# Patient Record
Sex: FEMALE | Race: OTHER | HISPANIC OR LATINO | Employment: UNEMPLOYED | URBAN - METROPOLITAN AREA
[De-identification: names, ages, dates, MRNs, and addresses within clinical notes are randomized per-mention and may not be internally consistent; named-entity substitution may affect disease eponyms.]

---
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
## 2024-01-10 ENCOUNTER — APPOINTMENT (OUTPATIENT)
Dept: MEDICAL GROUP | Facility: MEDICAL CENTER | Age: 19
End: 2024-01-10
Payer: COMMERCIAL

## 2024-02-02 ENCOUNTER — APPOINTMENT (OUTPATIENT)
Dept: MEDICAL GROUP | Facility: MEDICAL CENTER | Age: 19
End: 2024-02-02
Payer: COMMERCIAL

## 2024-03-04 ENCOUNTER — GYNECOLOGY VISIT (OUTPATIENT)
Dept: OBGYN | Facility: CLINIC | Age: 19
End: 2024-03-04
Payer: COMMERCIAL

## 2024-03-04 ENCOUNTER — APPOINTMENT (OUTPATIENT)
Dept: OBGYN | Facility: CLINIC | Age: 19
End: 2024-03-04
Payer: COMMERCIAL

## 2024-03-04 VITALS
WEIGHT: 174 LBS | DIASTOLIC BLOOD PRESSURE: 77 MMHG | HEIGHT: 62 IN | SYSTOLIC BLOOD PRESSURE: 91 MMHG | BODY MASS INDEX: 32.02 KG/M2

## 2024-03-04 DIAGNOSIS — N92.0 MENORRHAGIA WITH REGULAR CYCLE: Primary | ICD-10-CM

## 2024-03-04 PROCEDURE — 3074F SYST BP LT 130 MM HG: CPT | Performed by: OBSTETRICS & GYNECOLOGY

## 2024-03-04 PROCEDURE — 3078F DIAST BP <80 MM HG: CPT | Performed by: OBSTETRICS & GYNECOLOGY

## 2024-03-04 PROCEDURE — 99212 OFFICE O/P EST SF 10 MIN: CPT | Performed by: OBSTETRICS & GYNECOLOGY

## 2024-03-04 RX ORDER — PROPRANOLOL HYDROCHLORIDE 120 MG/1
120 CAPSULE, EXTENDED RELEASE ORAL DAILY
COMMUNITY

## 2024-03-04 RX ORDER — LAMOTRIGINE 25 MG/1
100 TABLET ORAL DAILY
COMMUNITY

## 2024-03-04 RX ORDER — LURASIDONE HYDROCHLORIDE 40 MG/1
40 TABLET, FILM COATED ORAL
COMMUNITY

## 2024-03-04 NOTE — PROGRESS NOTES
New GYN Problem Note    CC:   Annual Exam      HPI:   Patient is a 18 y.o.  who presents complaining of heavy menses for all her life, even on OCP. She has been on OCP since about age 15 (Blisovi) and then was switched to a differed pill for a time but it caused more nausea and recently resumed Blisovi about 4 months ago. She reports that even on the pill her periods are heavy and cause her fatigue, iron deficiency anemia. She states her mother also has always had heavy periods all her life. She denies knowledge of a family history of bleeding disorders.  She claims her periods are irregular even on birth control but just states they don't start on the same day of each placebo week. Pt is counseled that this is normal.  She admits she has a hard time remembering to take her pill and misses up to 3 or 4 each month.  She takes a lot of other medication for bipolar, anxiety and ADHD so she would like less medication in general and would like to discuss alternatives to OCP.    She is currently sexually active but not since about January.  She has multiple labs ordered by PCP in November including thyroid and STD screening but has yet to complete them.      GYNECOLOGIC HISTORY:   Current Sexual Activity: yes  History of sexually transmitted diseases? No  Abnormal discharge? No     Menstrual History  Patient's last menstrual period was Patient's last menstrual period was 2024 (exact date).  Periods are regular  q 25-28 days, lasting 5 days.     Clots or heavy flow: Yes  Intermenstrual bleeding/spotting: No  Sexual problems: No  Significant pelvic pain: No  Bothersome PMS: No  Bothersome menopausal symptoms: No     Contraception  Blisovi      Pap History  Last Pap: n/a    Sexually active:    Social History     Substance and Sexual Activity   Sexual Activity Not Currently    Partners: Male    Birth control/protection: Pill, Emergency Contraception       OBSTETRIC HISTORY:  OB History    Para Term  " AB Living   0 0 0 0 0 0   SAB IAB Ectopic Molar Multiple Live Births   0 0 0 0 0 0       MEDICAL HISTORY:  Past Medical History:   Diagnosis Date    Anemia     Anxiety     Depression     GERD (gastroesophageal reflux disease)     Migraine        SURGICAL HISTORY:  History reviewed. No pertinent surgical history.    FAMILY HISTORY:  Family History   Problem Relation Age of Onset    Alcohol abuse Mother     Diabetes Maternal Grandmother     Hyperlipidemia Maternal Grandmother     Hypertension Paternal Aunt        ALLERGIES / REACTIONS:  Allergies   Allergen Reactions    Kiwi Extract Anaphylaxis    Pineapple Anaphylaxis     Throat closes, rashes inside mouth    Brazil Nuts      Mild to severe       SOCIAL HISTORY:   reports that she has never smoked. She has never used smokeless tobacco. She reports that she does not drink alcohol and does not use drugs.    ROS:   Positive ROS: none  Gen: no fevers or chills, no significant weight loss or gain, excessive fatigue  Respiratory:  no cough or dyspnea  Cardiac:  no chest pain, no palpitations, no syncope  Breast: no breast discharge, pain, lump or skin changes  GI:  no heartburn, no abdominal pain, no nausea or vomiting  Urinary: no dysuria, urgency, frequency, incontinence   Psych: no depression or anxiety  Neuro: no migraines with aura, fainting spells, numbness or tingling  Extremities: no joint pain, persistently swollen ankles, recurrent leg cramps       PHYSICAL EXAMINATION:  Vital Signs:   Vitals:    24 1114   BP: 91/77   BP Location: Right arm   Patient Position: Sitting   BP Cuff Size: Adult   Weight: 174 lb   Height: 5' 2\"     Body mass index is 31.83 kg/m².  Constitutional: The patient is well developed and well nourished.  Psychiatric: Patient is oriented to time place and person.   Skin: No rash observed.  Neck: Neck appears symmetric.  Respiratory: normal effort  Abdomen: Soft, non-tender.  Pelvic: exam deferred today  Extremeties: Legs are " symmetric and without tenderness. There is no edema present.    ASSESSMENT AND PLAN:  18 y.o.       1. Menorrhagia with regular cycle   - discussed alternatives to OCP to control her bleeding   - first line choice would be Mirena IUD.  Discussed insertion, SE profile, bleeding efficacy   - discussed 2nd choice would be depo shot but often takes 2+ shot before benefits of amenorrhea.  In the preceding 6 mo there may be frequent or heavy periods of bleeding. This sometimes precludes continued use   - after discussion, pt states she will schedule for IUD insertion. States she has a poor pain tolerance and is counseled that MOST people will succeed with in office placement but if not, we can schedule for OR placement   - discussed screening for VWD in addition to other labs ordered by PCP.  She will get these done    - [facility] PROFILE; Future        Kenia Roach D.O.

## 2024-03-08 ENCOUNTER — APPOINTMENT (OUTPATIENT)
Dept: MEDICAL GROUP | Facility: MEDICAL CENTER | Age: 19
End: 2024-03-08
Payer: COMMERCIAL

## 2024-03-24 ENCOUNTER — APPOINTMENT (OUTPATIENT)
Dept: TELEHEALTH | Facility: TELEMEDICINE | Age: 19
End: 2024-03-24
Payer: COMMERCIAL

## 2024-04-12 ENCOUNTER — APPOINTMENT (OUTPATIENT)
Dept: OBGYN | Facility: CLINIC | Age: 19
End: 2024-04-12
Payer: COMMERCIAL

## 2024-04-24 ENCOUNTER — APPOINTMENT (OUTPATIENT)
Dept: URGENT CARE | Facility: CLINIC | Age: 19
End: 2024-04-24
Payer: COMMERCIAL

## 2024-05-16 ENCOUNTER — TELEMEDICINE (OUTPATIENT)
Dept: MEDICAL GROUP | Facility: MEDICAL CENTER | Age: 19
End: 2024-05-16
Payer: COMMERCIAL

## 2024-05-16 VITALS — HEIGHT: 62 IN | BODY MASS INDEX: 30.36 KG/M2 | WEIGHT: 165 LBS

## 2024-05-16 DIAGNOSIS — R53.83 FATIGUE, UNSPECIFIED TYPE: ICD-10-CM

## 2024-05-16 DIAGNOSIS — R23.3 EASY BRUISING: ICD-10-CM

## 2024-05-16 DIAGNOSIS — F33.1 MODERATE EPISODE OF RECURRENT MAJOR DEPRESSIVE DISORDER (HCC): ICD-10-CM

## 2024-05-16 DIAGNOSIS — Z91.018 FOOD ALLERGY: ICD-10-CM

## 2024-05-16 DIAGNOSIS — K21.9 GASTROESOPHAGEAL REFLUX DISEASE WITHOUT ESOPHAGITIS: ICD-10-CM

## 2024-05-16 DIAGNOSIS — R07.9 CHEST PAIN, UNSPECIFIED TYPE: ICD-10-CM

## 2024-05-16 DIAGNOSIS — F41.9 ANXIETY: ICD-10-CM

## 2024-05-16 DIAGNOSIS — F31.81 BIPOLAR 2 DISORDER (HCC): ICD-10-CM

## 2024-05-16 PROBLEM — G47.26 SHIFT WORK SLEEP DISORDER: Status: ACTIVE | Noted: 2024-05-16

## 2024-05-16 PROCEDURE — 99214 OFFICE O/P EST MOD 30 MIN: CPT | Mod: 95

## 2024-05-16 RX ORDER — DOXYCYCLINE HYCLATE 50 MG/1
50 CAPSULE ORAL
COMMUNITY
Start: 2024-05-01

## 2024-05-16 RX ORDER — LAMOTRIGINE 100 MG/1
100 TABLET ORAL DAILY
COMMUNITY

## 2024-05-16 RX ORDER — METHYLPHENIDATE HYDROCHLORIDE 10 MG/1
TABLET ORAL
COMMUNITY

## 2024-05-16 RX ORDER — CLINDAMYCIN PHOSPHATE 10 UG/ML
LOTION TOPICAL
COMMUNITY
Start: 2024-04-29

## 2024-05-16 RX ORDER — TRETINOIN 0.5 MG/G
CREAM TOPICAL
COMMUNITY
Start: 2024-04-28

## 2024-05-16 ASSESSMENT — PATIENT HEALTH QUESTIONNAIRE - PHQ9: CLINICAL INTERPRETATION OF PHQ2 SCORE: 0

## 2024-05-16 NOTE — PROGRESS NOTES
Virtual Visit: Established Patient   This visit was conducted via Zoom using secure and encrypted videoconferencing technology.   The patient was in their home in the [facility].      The patient's identity was confirmed and verbal consent was obtained for this virtual visit.  Verbal consent was acquired by the patient to use yetu ambient listening note generation during this visit Yes     Subjective:   CC:   Chief Complaint   Patient presents with    Follow-Up     RANJANA ZALDIVAR is a 18 y.o. female presenting for evaluation and management of:    History of Present Illness  The patient presents via virtual visit for follow-up of multiple medical concerns.    Bipolar/ Depression/anxiety: The patient has been under the care of a psychiatrist, which she reports as beneficial. However, she acknowledges that some of her medications do not provide sufficient relief. She has been diagnosed with bipolar disorder and is currently on lamotrigine 100 mg once daily. She discontinued Latuda due to an increased dosage that resulted in irritability and sleep disturbances. She is scheduled for a follow-up with her psychiatrist this coming Saturday.    Chest pain/GERD: The patient reports a persistent tightness in the middle of her chest, which she initially attributed to anxiety. The frequency of these episodes has increased, occurring 2 to 3 times per week. She acknowledges the onset of these symptoms prior to her anxiety attacks. She also reports occasional shortness of breath, which she attributes to overthinking. She has attempted to alleviate her symptoms with Tums, which provide temporary relief for approximately 5 minutes, and an antacid reducer, omeprazole, for a 14-day period, but these have not provided relief. She has not observed anxiety prior to these episodes. Lemonade, salads, sugary drinks, and certain foods exacerbate her symptoms. She denies a burning sensation in her throat, but acknowledges  a build-up in her throat. She has experienced severe nausea on 1 or 2 occasions.    Fatigue/easy bruising: She reports feeling fatigued and unwell, with a bruise on her leg that has been present for the past 2 months following a fall. She finds it challenging to recover from these symptoms and believes her body is taking longer to recover. She did not complete the blood work ordered in 11/2023. She has been attempting to eat while working, even if it is just a drink, to avoid eating throughout the day. She has noticed that it is harder to lose weight.    Allergies: The patient expresses interest in undergoing an allergy test. She recounts an incident where she experienced throat constriction, tingling lips, and a thick feeling in her tongue after eating a banana muffin.            ROS   See HPI    Current medicines (including changes today)  Current Outpatient Medications   Medication Sig Dispense Refill    tretinoin (RETIN-A) 0.05 % cream APPLY A THIN LAYER TO ENTIRE FACE ONCE A DAY (AT BEDTIME)      methylphenidate (RITALIN) 10 MG Tab       doxycycline (VIBRAMYCIN) 50 MG capsule Take 50 mg by mouth every day.      CLINDAMYCIN PHOSPHATE,TOPICAL, 1 % Lotion APPLY TO FACE ONCE A DAY, IN THE MORNING      propranolol CR (INDERAL LA) 120 MG CAPSULE SR 24 HR Take 120 mg by mouth every day.      BLISOVI FE 1/20 1-20 MG-MCG per tablet Take 1 Tablet by mouth every day. 30 Tablet 11    triamcinolone acetonide (KENALOG) 0.1 % Ointment Apply 1 Units topically 2 times a day. 453.6 g 0    lamoTRIgine (LAMICTAL) 100 MG Tab Take 100 mg by mouth every day.       No current facility-administered medications for this visit.       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  Skin: No rashes in visible areas.  Eye: Round. Conjunctiva clear, lids normal. No icterus.   ENMT: Lips pink without lesions, good dentition, moist mucous membranes. Phonation normal.  Neck: No masses, no thyromegaly. Moves freely without pain.  Respiratory: Unlabored respiratory effort, no cough or audible wheeze  Psych: Alert and oriented x3, normal affect and mood.     Assessment and Plan:   The following treatment plan was discussed:     1. Bipolar 2 disorder (HCC)  2. Moderate episode of recurrent major depressive disorder (HCC)  3. Anxiety  Chronic, unstable  Recommend following up with psychiatry to discuss side effects from her medications with potential cause for her fatigue.  We also discussed that anxiety could be contributing to her chest pain and symptoms of GERD.  -Lamictal 100 mg daily  -Follow-up with psychiatry    4. Chest pain, unspecified type  5. Gastroesophageal reflux disease without esophagitis  Acute, uncomplicated  Given her age, her chest pain could be anxiety-related. The patient has been advised to try Pepcid and engage in deep breathing exercises to calm herself. If her symptoms improve or completely resolve with Pepcid, she has been advised to avoid certain foods. Should her symptoms persist, she will return for a virtual evaluation.    6. Fatigue, unspecified type  Undiagnosed from with uncertain prognosis  The patient's night shift work schedule may be exacerbating her fatigue, despite adequate sleep. The fatigue could be a side effect of her Lamictal medication. A blood work has been ordered to assess for anemia, diabetes, and cholesterol levels. The patient has been advised to complete the " blood work as soon as possible. Additionally, she has been advised to switch her shifts to day shifts.    7. Easy bruising  Undiagnosed problem with uncertain prognosis  Unable to visualize bruising on her legs due to virtual visit.  Recommend completing blood work to evaluate for signs of anemia.    8. Food allergy  Chronic, stable  A referral has been made for allergy testing.  - Referral to Allergy    Other orders  - tretinoin (RETIN-A) 0.05 % cream; APPLY A THIN LAYER TO ENTIRE FACE ONCE A DAY (AT BEDTIME)  - methylphenidate (RITALIN) 10 MG Tab  - doxycycline (VIBRAMYCIN) 50 MG capsule; Take 50 mg by mouth every day.  - CLINDAMYCIN PHOSPHATE,TOPICAL, 1 % Lotion; APPLY TO FACE ONCE A DAY, IN THE MORNING  - lamoTRIgine (LAMICTAL) 100 MG Tab; Take 100 mg by mouth every day.      Follow-up: Return if symptoms worsen or fail to improve.

## 2024-05-30 ENCOUNTER — HOSPITAL ENCOUNTER (OUTPATIENT)
Dept: LAB | Facility: MEDICAL CENTER | Age: 19
End: 2024-05-30
Payer: COMMERCIAL

## 2024-05-30 DIAGNOSIS — Z11.4 ENCOUNTER FOR SCREENING FOR HIV: ICD-10-CM

## 2024-05-30 DIAGNOSIS — D64.9 ANEMIA, UNSPECIFIED TYPE: ICD-10-CM

## 2024-05-30 DIAGNOSIS — Z11.3 SCREENING EXAMINATION FOR SEXUALLY TRANSMITTED DISEASE: ICD-10-CM

## 2024-05-30 DIAGNOSIS — E66.9 OBESITY (BMI 30-39.9): ICD-10-CM

## 2024-05-30 DIAGNOSIS — Z11.59 NEED FOR HEPATITIS C SCREENING TEST: ICD-10-CM

## 2024-05-30 LAB
ALBUMIN SERPL BCP-MCNC: 4.2 G/DL (ref 3.2–4.9)
ALBUMIN/GLOB SERPL: 1.2 G/DL
ALP SERPL-CCNC: 104 U/L (ref 30–99)
ALT SERPL-CCNC: 16 U/L (ref 2–50)
ANION GAP SERPL CALC-SCNC: 11 MMOL/L (ref 7–16)
AST SERPL-CCNC: 16 U/L (ref 12–45)
BILIRUB SERPL-MCNC: 0.4 MG/DL (ref 0.1–1.5)
BUN SERPL-MCNC: 11 MG/DL (ref 8–22)
CALCIUM ALBUM COR SERPL-MCNC: 9.1 MG/DL (ref 8.5–10.5)
CALCIUM SERPL-MCNC: 9.3 MG/DL (ref 8.5–10.5)
CHLORIDE SERPL-SCNC: 105 MMOL/L (ref 96–112)
CO2 SERPL-SCNC: 22 MMOL/L (ref 20–33)
CREAT SERPL-MCNC: 0.74 MG/DL (ref 0.5–1.4)
GFR SERPLBLD CREATININE-BSD FMLA CKD-EPI: 119 ML/MIN/1.73 M 2
GLOBULIN SER CALC-MCNC: 3.5 G/DL (ref 1.9–3.5)
GLUCOSE SERPL-MCNC: 105 MG/DL (ref 65–99)
HCV AB SER QL: NORMAL
HIV 1+2 AB+HIV1 P24 AG SERPL QL IA: NORMAL
POTASSIUM SERPL-SCNC: 4.5 MMOL/L (ref 3.6–5.5)
PROT SERPL-MCNC: 7.7 G/DL (ref 6–8.2)
SODIUM SERPL-SCNC: 138 MMOL/L (ref 135–145)
T PALLIDUM AB SER QL IA: NORMAL
TSH SERPL DL<=0.005 MIU/L-ACNC: 2.31 UIU/ML (ref 0.38–5.33)

## 2024-06-15 ENCOUNTER — APPOINTMENT (OUTPATIENT)
Dept: URGENT CARE | Facility: PHYSICIAN GROUP | Age: 19
End: 2024-06-15
Payer: COMMERCIAL

## 2024-06-19 ENCOUNTER — APPOINTMENT (OUTPATIENT)
Dept: MEDICAL GROUP | Facility: MEDICAL CENTER | Age: 19
End: 2024-06-19
Payer: COMMERCIAL

## 2024-06-20 ENCOUNTER — HOSPITAL ENCOUNTER (EMERGENCY)
Facility: MEDICAL CENTER | Age: 19
End: 2024-06-20
Attending: EMERGENCY MEDICINE
Payer: COMMERCIAL

## 2024-06-20 VITALS
HEART RATE: 65 BPM | BODY MASS INDEX: 32.22 KG/M2 | TEMPERATURE: 98.3 F | DIASTOLIC BLOOD PRESSURE: 82 MMHG | WEIGHT: 176.15 LBS | SYSTOLIC BLOOD PRESSURE: 125 MMHG | OXYGEN SATURATION: 97 % | RESPIRATION RATE: 14 BRPM

## 2024-06-20 DIAGNOSIS — F41.0 PANIC ATTACK: ICD-10-CM

## 2024-06-20 LAB
AMPHET UR QL SCN: NEGATIVE
BARBITURATES UR QL SCN: NEGATIVE
BENZODIAZ UR QL SCN: NEGATIVE
BZE UR QL SCN: NEGATIVE
CANNABINOIDS UR QL SCN: NEGATIVE
FENTANYL UR QL: NEGATIVE
HCG UR QL: NEGATIVE
METHADONE UR QL SCN: NEGATIVE
OPIATES UR QL SCN: NEGATIVE
OXYCODONE UR QL SCN: NEGATIVE
PCP UR QL SCN: NEGATIVE
PROPOXYPH UR QL SCN: NEGATIVE

## 2024-06-20 PROCEDURE — 81025 URINE PREGNANCY TEST: CPT

## 2024-06-20 PROCEDURE — 99285 EMERGENCY DEPT VISIT HI MDM: CPT

## 2024-06-20 PROCEDURE — 90791 PSYCH DIAGNOSTIC EVALUATION: CPT

## 2024-06-20 PROCEDURE — 302970 POC BREATHALIZER: Performed by: EMERGENCY MEDICINE

## 2024-06-20 PROCEDURE — 80307 DRUG TEST PRSMV CHEM ANLYZR: CPT

## 2024-06-20 NOTE — ED NOTES
Belongings searched by Security.  Face sheet placed in 1 bag and stored under desk at nurses station due to cards and [employer] employee badge.

## 2024-06-20 NOTE — ED TRIAGE NOTES
"Pt ambulated to triage with Suicidal ideation. Pt tearful and states she has had increasing thoughts of hurting herself and that \"all my support system has fallen through\" pt states hx of depression and attempts at suicide previously. Pt taken to [facility] changed into hospital clothing and report off to Melissa Molina. Legal Hold written.   "

## 2024-06-20 NOTE — ED NOTES
To 35 with same report as triage note.  Pt changed into blue hospital clothing.  Pt educated on SI P&P.  Pt remains tearful, clam and cooperative at this time.  Breathalyzer reads 0.00
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
## 2024-06-21 NOTE — ED NOTES
D/C home with written and verbal instructions re: Rx, activity, f/u.  Verbalizes understanding.  Ambulated out with steady gait

## 2024-06-21 NOTE — CONSULTS
Name: Cassandra Duran  MRN: [id number]  : 2005  Age: 19 y.o.  Date of assessment: 2024  PCP: SUNITHA De Luna  Persons in attendance: Patient  Patient [facility]: [facility]    CHIEF COMPLAINT/PRESENTING ISSUE (as stated by pt):   Chief Complaint   Patient presents with    Suicidal Ideation      Pt is a       CURRENT LIVING SITUATION/SOCIAL SUPPORT/FINANCIAL RESOURCES: ***    BEHAVIORAL HEALTH/SUBSTANCE USE TREATMENT HISTORY  Does patient/parent report a history of prior behavioral health/substance use treatment for patient?   { OUTPATIENT TREATMENT:73060041}    SAFETY ASSESSMENT - SELF  Does patient acknowledge current or past symptoms of dangerousness to self or is previous history noted? {yes no:288354}  Does parent/significant other report patient has current or past symptoms of dangerousness to self? {YES/NO:63}  Does presenting problem suggest symptoms of dangerousness to self? { DANGER TO SELF:24152603}    SAFETY ASSESSMENT - OTHERS  Does patient acknowledge current or past symptoms of aggressive behavior or risk to others or is previous history noted? {yes no:291164}  Does parent/significant other report patient has current or past symptoms of aggressive behavior or risk to others?  {YES/NO:63}  Does presenting problem suggest symptoms of dangerousness to others? { DANGER TO OTHERS:27198341}    LEGAL HISTORY  Does patient acknowledge history of arrest/group home/USP or is previous history noted? {yes no:691131}    Crisis Safety Plan completed and copy given to patient? {YES/NO:63}    ABUSE/NEGLECT SCREENING  Does patient report feeling “unsafe” in his/her home, or afraid of anyone?  {yes no:334457}  Does patient report any history of physical, sexual, or emotional abuse?  {yes no:223493}  Does parent or significant other report any of the above? {YES/NO:63}  Is there evidence of neglect by self?  {yes no:871754}  Is there evidence of neglect by a  caregiver? {yes no:188944}  Does the patient/parent report any history of CPS/APS/police involvement related to suspected abuse/neglect or domestic violence? {yes no:962818}  Based on the information provided during the current assessment, is a mandated report of suspected abuse/neglect being made?  { AUTHORITY NOTIFIED:34414611}    SUBSTANCE USE SCREENING  { SUBSTANCE SCREEN:33957020}      MENTAL STATUS   Participation: {[facility] PARTICIPATION MEASURES:28977642}  Grooming: {[facility] GROOMIN}  Orientation: {[facility] ORIENTATION:02404243}  Behavior: {[facility] BEHAVIOR:86166543}  Eye contact: {[facility] EYE CONTACT:08620870}  Mood: {[facility] MOOD:99539407}  Affect: {[facility] AFFECT:35886970}  Thought process: {[facility] THOUGHT PROCESS:44665241}  Thought content: {[facility] THOUGHT CONTENT:06815599}  Speech: {[facility] SPEECH:81776815}  Perception: {[facility] PERCEPTION:76177266}  Memory:  {[facility] MEMORY:77755495}  Insight: {GOOD/ADEQUATE/LIMITED/POOR:64664208}  Judgment:  {GOOD/ADEQUATE/LIMITED/POOR:80932460}  Other:    Collateral information: ***  Source:  [] Significant other present in person:   [] Significant other by telephone  [] [facility]   [] [facility] Nursing Staff  [] [facility] Medical Record  [] Other:     [] Unable to complete full assessment due to:  [] Acute intoxication  [] Patient declined to participate/engage  [] Patient verbally unresponsive  [] Significant cognitive deficits  [] Significant perceptual distortions or behavioral disorganization  [] Other:      CLINICAL IMPRESSIONS:  Primary:  ***  Secondary:  ***       IDENTIFIED NEEDS/PLAN:  [Trigger DISPOSITION list for any items marked]    []  Imminent safety risk - self [] Imminent safety risk - others   []  Acute substance withdrawal []  Psychosis/Impaired reality testing   []  Mood/anxiety []  Substance use/Addictive behavior   []  Maladaptive behaviro []  Parent/child conflict   []  Family/Couples conflict []  Biomedical   []  Housing []  Financial   []   Legal   Occupational/Educational   []  Domestic violence []  Other:     Recommended Plan of Care:  {[facility] DISPOSITION:35563948}  *Telesitter may not be utilized for moderate or high risk patients    Has the Recommended Plan of Care/Level of Observation been reviewed with the patient's assigned nurse? {yes no:462183}    Does patient/parent or guardian express agreement with the above plan? {yes no:302257}    Referral appointment(s) scheduled? {YES/NO:63}    Alert team only:   I have discussed findings and recommendations with  *** who is in agreement with these recommendations.     Referral information sent to the following community providers :    If applicable : Referred  to : *** for legal hold follow up at (time): ***      Skip Bee R.N.

## 2024-06-21 NOTE — CONSULTS
"  Name: Cassandra Duran  MRN: [id number]  : 2005  Age: 19 y.o.  Date of assessment: 2024  PCP: SUNITHA De Luna  Persons in attendance: Patient  Patient Location: [facility]    CHIEF COMPLAINT/PRESENTING ISSUE (as stated by pt):   Chief Complaint   Patient presents with    Suicidal Ideation      PT is a 19 year old female BIB self for SI with no plan. 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 Pt presents as A + O x 4 with calm mood, congruent affect, linear, logical , free of delusions/hallucinations, cooperative, pleasant. At the time of assessment, pt denies SI/HI/AH/VH's. She reports a few ongoing psycho-social stressors including a friend passing away this past weekend, a recent verbal fight with her brother, and ongoing strife between pt and her mother. PT also reports that while at work earlier today, a pt \"went off\" on pt and this greatly upset the patient. PT reports that she was feeling a fight or flight response, tunnel vision, and an impending sense of doom. She reports that she was never suicidal but just \"wanted to be somewhere else.\" PT now recognizes that she was experiencing a panic attack, however, feels much better now. Pt reports one suicide attempt3 years ago where she cut her wrists. Medications include methylphenidate, Propranolol, and Lamictal, she reports that she is compliant with medications, however, did not take her medications today (she notes that this could have contributed her panic attack). PT is connected with therapy through [facility] (therapist is Joceline Guido) and she has her next appointment tomorrow. PT is also connected with psychiatry at [facility] (she is not sure who her psychiatrist is) and reports that she has a upcoming appointment soon bit unsure of exact date. She reports that she will call tomorrow morning and see she is able to " obtain appointment sooner. Denies alcohol and drug use.    PT able to contract for safety and participate In safety plan. Consulted with ERP and pt will DC to self with appropriate resources.    CURRENT LIVING SITUATION/SOCIAL SUPPORT/FINANCIAL RESOURCES: PT lives with her mother and little brother. Reports good social support. 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    BEHAVIORAL HEALTH/SUBSTANCE USE TREATMENT HISTORY  Does patient/parent report a history of prior behavioral health/substance use treatment for patient?   Yes:    Dates Level of Care Facilty/Provider Diagnosis/Problem Medications   Current  Outpt - Therapy [facility] - Joceline Guido, next appt tomorrow Bipolar DO    Current  Outpt - Psychiatry  [facility]  - unsure of psychiatric or next appointment  Bipolar DO methylphenidate, Propranolol, and Lamictal                                                               SAFETY ASSESSMENT - SELF  Does patient acknowledge current or past symptoms of dangerousness to self or is previous history noted? Yes - previous SA 3 years ago.   Does parent/significant other report patient has current or past symptoms of dangerousness to self? NA  Does presenting problem suggest symptoms of dangerousness to self? No - denies SI    SAFETY ASSESSMENT - OTHERS  Does patient acknowledge current or past symptoms of aggressive behavior or risk to others or is previous history noted? no  Does parent/significant other report patient has current or past symptoms of aggressive behavior or risk to others?  N\A  Does presenting problem suggest symptoms of dangerousness to others? No    LEGAL HISTORY  Does patient acknowledge history of arrest/long-term/long term or is previous history noted? no    Crisis Safety Plan completed and copy given to patient? Yes  -see medic manager    ABUSE/NEGLECT SCREENING  Does patient report feeling “unsafe” in his/her home, or afraid of anyone?  no  Does patient report any history of physical, sexual,  "or emotional abuse?  no  Does parent or significant other report any of the above? no  Is there evidence of neglect by self?  no  Is there evidence of neglect by a caregiver? no  Does the patient/parent report any history of CPS/APS/police involvement related to suspected abuse/neglect or domestic violence? no  Based on the information provided during the current assessment, is a mandated report of suspected abuse/neglect being made?  No    SUBSTANCE USE SCREENING  Yes:  Alhaji all substances used in the past 30 days:    Denies       Last Use Amount   []   Alcohol     []   Marijuana     []   Heroin     []   Prescription Opioids  (used without prescription, for    recreation, or in excess of prescribed amount)     []   Other Prescription  (used without prescription, for    recreation, or in excess of prescribed amount)     []   Cocaine      []   Methamphetamine     []   \"\" drugs (ectasy, MDMA)     []   Other substances        UDS results: Negative   Breathalyzer results: 0.00    What consequences does the patient associate with any of the above substance use and or addictive behaviors? None    Risk factors for detox (check all that apply):  []  Seizures   []  Diaphoretic (sweating)   []  Tremors   []  Hallucinations   []  Increased blood pressure   []  Decreased blood pressure   []  Other   []  None      [] Patient education on risk factors for detoxification and instructed to return to ER as needed.      MENTAL STATUS   Participation: Active verbal participation  Grooming: Casual  Orientation: Alert and Fully Oriented  Behavior: Calm  Eye contact: Good  Mood: Euthymic  Affect: Flexible and Full range  Thought process: Logical and Goal-directed  Thought content: Within normal limits  Speech: Rate within normal limits and Volume within normal limits  Perception: Within normal limits  Memory:  No gross evidence of memory deficits  Insight: Adequate  Judgment:  Adequate  Other:    Collateral information: "   Source:  [] Significant other present in person:   [] Significant other by telephone  [] [facility]   [x] [facility]  [x] [facility]  [x] Other: ERP    [] Unable to complete full assessment due to:  [] Acute intoxication  [] Patient declined to participate/engage  [] Patient verbally unresponsive  [] Significant cognitive deficits  [] Significant perceptual distortions or behavioral disorganization  [] Other:      CLINICAL IMPRESSIONS:  Primary:  Panic Attack   Secondary:         IDENTIFIED NEEDS/PLAN:  [Trigger DISPOSITION list for any items marked]    []  Imminent safety risk - self [] Imminent safety risk - others   []  Acute substance withdrawal []  Psychosis/Impaired reality testing   [x]  Mood/anxiety []  Substance use/Addictive behavior   []  Maladaptive behaviro []  Parent/child conflict   []  Family/Couples conflict []  Biomedical   []  Housing []  Financial   []   Legal  Occupational/Educational   []  Domestic violence []  Other:     Recommended Plan of Care:  PT able to contract for safety and participate In safety plan. Consulted with ERP and pt will DC to self. PT given mental health resource packet, 998 info, [facility] info, Teen Text Line info.   *Telesitter may not be utilized for moderate or high risk patients    Has the Recommended Plan of Care/Level of Observation been reviewed with the patient's assigned nurse? yes    Does patient/parent or guardian express agreement with the above plan? yes    Referral appointment(s) scheduled? N\A    Alert team only: PT to DC to self  I have discussed findings and recommendations with Dr. Coates who is in agreement with these recommendations.     Referral information sent to the following community providers : DEREJE Bee R.N., MBA

## 2024-07-09 ENCOUNTER — APPOINTMENT (OUTPATIENT)
Dept: MEDICAL GROUP | Facility: MEDICAL CENTER | Age: 19
End: 2024-07-09
Payer: COMMERCIAL

## 2024-07-15 ENCOUNTER — APPOINTMENT (OUTPATIENT)
Dept: MEDICAL GROUP | Facility: MEDICAL CENTER | Age: 19
End: 2024-07-15
Payer: COMMERCIAL

## 2024-07-25 ENCOUNTER — PHARMACY VISIT (OUTPATIENT)
Dept: PHARMACY | Facility: MEDICAL CENTER | Age: 19
End: 2024-07-25
Payer: COMMERCIAL

## 2024-07-25 PROCEDURE — RXMED WILLOW AMBULATORY MEDICATION CHARGE: Performed by: INTERNAL MEDICINE

## 2024-07-25 RX ORDER — HUMAN PAPILLOMAVIRUS 9-VALENT VACCINE, RECOMBINANT 40; 60; 40; 20; 20; 20; 20; 20; 30 UG/.5ML; UG/.5ML; UG/.5ML; UG/.5ML; UG/.5ML; UG/.5ML; UG/.5ML; UG/.5ML; UG/.5ML
0.5 INJECTION, SUSPENSION INTRAMUSCULAR
Qty: 0.5 ML | Refills: 0 | OUTPATIENT
Start: 2024-07-25

## 2024-07-25 RX ORDER — CLOSTRIDIUM TETANI TOXOID ANTIGEN (FORMALDEHYDE INACTIVATED), CORYNEBACTERIUM DIPHTHERIAE TOXOID ANTIGEN (FORMALDEHYDE INACTIVATED), BORDETELLA PERTUSSIS TOXOID ANTIGEN (GLUTARALDEHYDE INACTIVATED), BORDETELLA PERTUSSIS FILAMENTOUS HEMAGGLUTININ ANTIGEN (FORMALDEHYDE INACTIVATED), BORDETELLA PERTUSSIS PERTACTIN ANTIGEN, AND BORDETELLA PERTUSSIS FIMBRIAE 2/3 ANTIGEN 5; 2; 2.5; 5; 3; 5 [LF]/.5ML; [LF]/.5ML; UG/.5ML; UG/.5ML; UG/.5ML; UG/.5ML
0.5 INJECTION, SUSPENSION INTRAMUSCULAR
Qty: 0.5 ML | Refills: 0 | OUTPATIENT
Start: 2024-07-25

## 2024-07-26 ENCOUNTER — APPOINTMENT (OUTPATIENT)
Dept: PHARMACY | Facility: MEDICAL CENTER | Age: 19
End: 2024-07-26
Payer: COMMERCIAL

## 2024-07-29 ENCOUNTER — APPOINTMENT (OUTPATIENT)
Dept: MEDICAL GROUP | Facility: MEDICAL CENTER | Age: 19
End: 2024-07-29
Payer: COMMERCIAL

## 2024-08-06 ENCOUNTER — APPOINTMENT (OUTPATIENT)
Dept: MEDICAL GROUP | Facility: MEDICAL CENTER | Age: 19
End: 2024-08-06
Payer: COMMERCIAL

## 2024-08-08 ENCOUNTER — APPOINTMENT (OUTPATIENT)
Dept: MEDICAL GROUP | Facility: MEDICAL CENTER | Age: 19
End: 2024-08-08
Payer: COMMERCIAL

## 2024-08-19 ENCOUNTER — APPOINTMENT (OUTPATIENT)
Dept: MEDICAL GROUP | Facility: MEDICAL CENTER | Age: 19
End: 2024-08-19
Payer: COMMERCIAL

## 2024-08-19 DIAGNOSIS — L30.9 ECZEMA, UNSPECIFIED TYPE: ICD-10-CM

## 2024-08-19 RX ORDER — TRIAMCINOLONE ACETONIDE 1 MG/G
1 OINTMENT TOPICAL 2 TIMES DAILY
Qty: 453.6 G | Refills: 0 | Status: SHIPPED | OUTPATIENT
Start: 2024-08-19

## 2024-08-19 NOTE — PROGRESS NOTES
Sent in refill of Triamcinolone as requested   Patient Specific Counseling (Will Not Stick From Patient To Patient): Patient educated Detail Level: Detailed Detail Level: Zone

## 2024-08-20 ENCOUNTER — APPOINTMENT (OUTPATIENT)
Dept: MEDICAL GROUP | Facility: MEDICAL CENTER | Age: 19
End: 2024-08-20
Payer: COMMERCIAL

## 2024-09-18 ENCOUNTER — APPOINTMENT (OUTPATIENT)
Dept: MEDICAL GROUP | Facility: MEDICAL CENTER | Age: 19
End: 2024-09-18
Payer: COMMERCIAL

## 2024-12-03 DIAGNOSIS — Z30.41 ENCOUNTER FOR BIRTH CONTROL PILLS MAINTENANCE: ICD-10-CM

## 2024-12-03 RX ORDER — NORETHINDRONE ACETATE AND ETHINYL ESTRADIOL 1MG-20(21)
1 KIT ORAL
Qty: 28 TABLET | Refills: 11 | Status: SHIPPED | OUTPATIENT
Start: 2024-12-03